# Patient Record
Sex: FEMALE | Race: AMERICAN INDIAN OR ALASKA NATIVE
[De-identification: names, ages, dates, MRNs, and addresses within clinical notes are randomized per-mention and may not be internally consistent; named-entity substitution may affect disease eponyms.]

---

## 2019-01-04 ENCOUNTER — HOSPITAL ENCOUNTER (EMERGENCY)
Dept: HOSPITAL 5 - ED | Age: 59
End: 2019-01-04
Payer: SELF-PAY

## 2021-12-09 ENCOUNTER — HOSPITAL ENCOUNTER (EMERGENCY)
Dept: HOSPITAL 5 - ED | Age: 61
LOS: 1 days | Discharge: HOME | End: 2021-12-10
Payer: COMMERCIAL

## 2021-12-09 DIAGNOSIS — F43.9: Primary | ICD-10-CM

## 2021-12-09 DIAGNOSIS — F41.9: ICD-10-CM

## 2021-12-09 DIAGNOSIS — R05.9: ICD-10-CM

## 2021-12-09 DIAGNOSIS — Z79.899: ICD-10-CM

## 2021-12-09 DIAGNOSIS — Z72.89: ICD-10-CM

## 2021-12-09 DIAGNOSIS — R53.81: ICD-10-CM

## 2021-12-09 DIAGNOSIS — Z88.0: ICD-10-CM

## 2021-12-09 PROCEDURE — 36415 COLL VENOUS BLD VENIPUNCTURE: CPT

## 2021-12-09 PROCEDURE — 85025 COMPLETE CBC W/AUTO DIFF WBC: CPT

## 2021-12-09 PROCEDURE — 99283 EMERGENCY DEPT VISIT LOW MDM: CPT

## 2021-12-09 PROCEDURE — 71046 X-RAY EXAM CHEST 2 VIEWS: CPT

## 2021-12-09 PROCEDURE — 80053 COMPREHEN METABOLIC PANEL: CPT

## 2021-12-09 PROCEDURE — 84443 ASSAY THYROID STIM HORMONE: CPT

## 2021-12-09 NOTE — EMERGENCY DEPARTMENT REPORT
ED General Adult HPI





- General


Chief complaint: Anxiety


Stated complaint: CHILLS/COLD


Time Seen by Provider: 12/09/21 23:03


Source: patient


Mode of arrival: Ambulatory


Limitations: No Limitations





- History of Present Illness


Initial comments: 


Patient 61-year-old female with history of anxiety and hypothyroidism who 

presents for chills malaise cough for the past 3 days.  States symptoms worsen 

tonight.  Patient denies wheezing there is no dizziness no chest pain no sore 

throat or ear pain.  Patient states she is Covid vaccinated.  Symptoms are 

exacerbated by nothing.  Symptoms are relieved by nothing tried.








- Related Data


                                    Allergies











Allergy/AdvReac Type Severity Reaction Status Date / Time


 


ampicillin Allergy  Rash Verified 01/04/19 04:38














ED Review of Systems


ROS: 


Stated complaint: CHILLS/COLD


Other details as noted in HPI





Constitutional: chills, malaise


Eyes: denies: eye pain, eye discharge, vision change


ENT: denies: ear pain, throat pain


Respiratory: cough


Cardiovascular: denies: chest pain, palpitations


Endocrine: no symptoms reported


Gastrointestinal: denies: abdominal pain, nausea, vomiting, diarrhea


Genitourinary: denies: urgency, dysuria, discharge


Musculoskeletal: denies: back pain, joint swelling, arthralgia


Skin: denies: rash, lesions


Neurological: denies: headache, weakness, paresthesias, vertigo


Psychiatric: anxiety.  denies: depression


Hematological/Lymphatic: denies: easy bleeding, easy bruising





ED Past Medical Hx





- Past Medical History


Previous Medical History?: No


Hx Psychiatric Treatment: Yes (Anxiety)


Additional medical history: Thyroid





- Surgical History


Past Surgical History?: No





- Social History


Smoking Status: Never Smoker


Substance Use Type: Alcohol





ED Physical Exam





- General


Limitations: No Limitations


General appearance: alert, anxious





- Head


Head exam: Present: atraumatic, normocephalic





- Eye


Eye exam: Present: normal appearance, PERRL, EOMI.  Absent: conjunctival 

injection, nystagmus


Pupils: Present: normal accommodation





- ENT


ENT exam: Present: normal exam, normal orophraynx, mucous membranes moist, TM's 

normal bilaterally, normal external ear exam





- Neck


Neck exam: Present: normal inspection, full ROM.  Absent: tenderness, 

meningismus, lymphadenopathy, thyromegaly





- Respiratory


Respiratory exam: Present: normal lung sounds bilaterally.  Absent: respiratory 

distress, wheezes, stridor, chest wall tenderness





- Cardiovascular


Cardiovascular Exam: Present: regular rate, normal rhythm, normal heart sounds





- GI/Abdominal


GI/Abdominal exam: Present: soft, normal bowel sounds.  Absent: distended, 

tenderness, bruit, hernia





- Rectal


Rectal exam: Present: deferred





- Extremities Exam


Extremities exam: Present: normal inspection, full ROM, normal capillary refill.

 Absent: tenderness





- Back Exam


Back exam: Present: normal inspection, full ROM.  Absent: CVA tenderness (R), 

CVA tenderness (L)





- Neurological Exam


Neurological exam: Present: alert, CN II-XII intact, normal gait





- Expanded Neurological Exam


  ** Expanded


Patient oriented to: Present: person, place, time


Speech: Present: fluid speech


Motor strength exam: RUE: 5, LUE: 5, RLE: 5, LLE: 5


Best Eye Response ([place]): (4) open spontaneously


Best Motor Response ([place]): (6) obeys commands


Best Verbal Response ([place]): (5) oriented


[place] Total: 15





- Psychiatric


Psychiatric exam: Present: normal affect, normal mood





- Skin


Skin exam: Present: warm, dry, intact, normal color.  Absent: rash





ED Course


                                   Vital Signs











  12/09/21





  22:22


 


Temperature 98.1 F


 


Pulse Rate 66


 


Respiratory 16





Rate 


 


Blood Pressure 136/100





[Left] 


 


O2 Sat by Pulse 97





Oximetry 














ED Medical Decision Making





- Lab Data


Result diagrams: 


                                 12/10/21 00:07





                                 12/10/21 00:07








Labs











  12/10/21 12/10/21 12/10/21





  00:07 00:07 00:07


 


WBC  7.4  


 


RBC  4.71  


 


Hgb  12.3  


 


Hct  38.9  


 


MCV  83  


 


MCH  26 L  


 


MCHC  32  


 


RDW  14.5  


 


Plt Count  197  


 


Lymph % (Auto)  24.3  


 


Mono % (Auto)  4.8  


 


Eos % (Auto)  1.0  


 


Baso % (Auto)  0.2  


 


Lymph # (Auto)  1.8  


 


Mono # (Auto)  0.4  


 


Eos # (Auto)  0.1  


 


Baso # (Auto)  0.0  


 


Seg Neutrophils %  69.7  


 


Seg Neutrophils #  5.2  


 


Sodium   142 


 


Potassium   4.2 


 


Chloride   108.5 H 


 


Carbon Dioxide   23 


 


Anion Gap   15 


 


BUN   15 


 


Creatinine   0.7 


 


Estimated GFR   > 60 


 


BUN/Creatinine Ratio   21 


 


Glucose   107 H 


 


Calcium   8.7 


 


Total Bilirubin   0.30 


 


AST   15 


 


ALT   18 


 


Alkaline Phosphatase   119 


 


Total Protein   7.5 


 


Albumin   4.2 


 


Albumin/Globulin Ratio   1.3 


 


TSH    1.240














- Radiology Data


Radiology results: report reviewed, image reviewed


XR chest routine 2V  


 


 INDICATION / CLINICAL INFORMATION: cough chills.  


 


 COMPARISON: None available.  


 


 FINDINGS:  


 


 SUPPORT DEVICES: None.  


 HEART /PULMONARY VASCULATURE: No significant abnormality.   


 LUNGS / PLEURA: No significant pulmonary or pleural abnormality. No 

pneumothorax.   


 


 ADDITIONAL FINDINGS: No significant additional findings.  


 


 IMPRESSION:  


 1. No acute findings.  


 


 Signer Name: Samantha Carranza MD   


 Signed: 12/9/2021 11:28 PM  


 Workstation Name: [email or web address]   


 


 


Transcribed By: MARCO ANTONIO  


Dictated By: SAMANTHA CARRANZA MD  


Electronically Authenticated By: SAMANTHA CARRANZA MD    


Signed Date/Time: 12/09/21 5496                                








- Medical Decision Making


Chest x-ray is normal no opacities no infiltrate.  Labs are normal plan DC to 

home, follow-up with your primary care doctor as needed.  Return to emergency 

should symptoms worsen.  Patient verbalized agreement and understanding with 

discharge plan.  Patient DC'd home in stable condition at this time





Critical care attestation.: 


If time is entered above; I have spent that time in minutes in the direct care 

of this critically ill patient, excluding procedure time.








ED Disposition


Clinical Impression: 


 Stress





Disposition: 01 HOME / SELF CARE / HOMELESS


Is pt being admited?: No


Does the pt Need Aspirin: No


Condition: Stable


Additional Instructions: 


Take all medications as prescribed by your doctor.  Follow-up with your doctor 

in 2 to 3 days.  Return to emergency department should symptoms worsen.


Referrals: 


KYA FONSECA MD [Staff Physician] - 3-5 Days


Forms:  Work/School Release Form(ED)


Time of Disposition: 01:12

## 2021-12-09 NOTE — XRAY REPORT
XR chest routine 2V



INDICATION / CLINICAL INFORMATION: cough chills.



COMPARISON: None available.



FINDINGS:



SUPPORT DEVICES: None.

HEART /PULMONARY VASCULATURE: No significant abnormality. 

LUNGS / PLEURA: No significant pulmonary or pleural abnormality. No pneumothorax. 



ADDITIONAL FINDINGS: No significant additional findings.



IMPRESSION:

1. No acute findings.



Signer Name: Ppo Burnham MD 

Signed: 12/9/2021 11:28 PM

Workstation Name: [email or web address]-

## 2021-12-10 VITALS — DIASTOLIC BLOOD PRESSURE: 69 MMHG | SYSTOLIC BLOOD PRESSURE: 151 MMHG

## 2021-12-10 LAB
ALBUMIN SERPL-MCNC: 4.2 G/DL (ref 3.9–5)
ALT SERPL-CCNC: 18 UNITS/L (ref 7–56)
BASOPHILS # (AUTO): 0 K/MM3 (ref 0–0.1)
BASOPHILS NFR BLD AUTO: 0.2 % (ref 0–1.8)
BUN SERPL-MCNC: 15 MG/DL (ref 7–17)
BUN/CREAT SERPL: 21 %
CALCIUM SERPL-MCNC: 8.7 MG/DL (ref 8.4–10.2)
EOSINOPHIL # BLD AUTO: 0.1 K/MM3 (ref 0–0.4)
EOSINOPHIL NFR BLD AUTO: 1 % (ref 0–4.3)
HCT VFR BLD CALC: 38.9 % (ref 30.3–42.9)
HEMOLYSIS INDEX: 11
HGB BLD-MCNC: 12.3 GM/DL (ref 10.1–14.3)
LYMPHOCYTES # BLD AUTO: 1.8 K/MM3 (ref 1.2–5.4)
LYMPHOCYTES NFR BLD AUTO: 24.3 % (ref 13.4–35)
MCHC RBC AUTO-ENTMCNC: 32 % (ref 30–34)
MCV RBC AUTO: 83 FL (ref 79–97)
MONOCYTES # (AUTO): 0.4 K/MM3 (ref 0–0.8)
MONOCYTES % (AUTO): 4.8 % (ref 0–7.3)
PLATELET # BLD: 197 K/MM3 (ref 140–440)
RBC # BLD AUTO: 4.71 M/MM3 (ref 3.65–5.03)